# Patient Record
Sex: MALE | Race: BLACK OR AFRICAN AMERICAN | NOT HISPANIC OR LATINO
[De-identification: names, ages, dates, MRNs, and addresses within clinical notes are randomized per-mention and may not be internally consistent; named-entity substitution may affect disease eponyms.]

---

## 2023-02-01 ENCOUNTER — P2P PATIENT RECORD (OUTPATIENT)
Age: 28
End: 2023-02-01

## 2023-02-02 ENCOUNTER — TELEPHONE ENCOUNTER (OUTPATIENT)
Dept: URBAN - METROPOLITAN AREA CLINIC 63 | Facility: CLINIC | Age: 28
End: 2023-02-02

## 2023-02-10 ENCOUNTER — WEB ENCOUNTER (OUTPATIENT)
Dept: URBAN - METROPOLITAN AREA CLINIC 63 | Facility: CLINIC | Age: 28
End: 2023-02-10

## 2023-02-10 ENCOUNTER — DASHBOARD ENCOUNTERS (OUTPATIENT)
Age: 28
End: 2023-02-10

## 2023-02-10 ENCOUNTER — OFFICE VISIT (OUTPATIENT)
Dept: URBAN - METROPOLITAN AREA CLINIC 63 | Facility: CLINIC | Age: 28
End: 2023-02-10
Payer: COMMERCIAL

## 2023-02-10 ENCOUNTER — LAB OUTSIDE AN ENCOUNTER (OUTPATIENT)
Dept: URBAN - METROPOLITAN AREA CLINIC 63 | Facility: CLINIC | Age: 28
End: 2023-02-10

## 2023-02-10 VITALS
SYSTOLIC BLOOD PRESSURE: 140 MMHG | OXYGEN SATURATION: 98 % | TEMPERATURE: 98.2 F | DIASTOLIC BLOOD PRESSURE: 90 MMHG | WEIGHT: 165 LBS | HEART RATE: 69 BPM | BODY MASS INDEX: 27.49 KG/M2 | HEIGHT: 65 IN

## 2023-02-10 DIAGNOSIS — K59.04 CHRONIC IDIOPATHIC CONSTIPATION: ICD-10-CM

## 2023-02-10 DIAGNOSIS — R10.9 ABDOMINAL DISCOMFORT: ICD-10-CM

## 2023-02-10 DIAGNOSIS — R19.4 CHANGE IN BOWEL HABIT: ICD-10-CM

## 2023-02-10 PROBLEM — 82934008: Status: ACTIVE | Noted: 2023-02-10

## 2023-02-10 PROCEDURE — 99203 OFFICE O/P NEW LOW 30 MIN: CPT | Performed by: NURSE PRACTITIONER

## 2023-02-10 RX ORDER — IBUPROFEN 800 MG/1
TAKE 1 TABLET BY MOUTH THREE TIMES A DAY TABLET, FILM COATED ORAL
Qty: 90 EACH | Refills: 0 | Status: DISCONTINUED | COMMUNITY

## 2023-02-10 RX ORDER — DICLOFENAC SODIUM 10 MG/G
APPLY 4 G TOPICALLY 4 TIMES A DAY GEL TOPICAL
Qty: 100 GRAM | Refills: 0 | Status: DISCONTINUED | COMMUNITY

## 2023-02-10 RX ORDER — ALBUTEROL SULFATE 108 UG/1
INHALE 1-2 PUFFS EVERY 4-6 HOURS AS NEEDED FOR WHEEZING FOR UP TO 30 DAYS INHALANT RESPIRATORY (INHALATION)
Qty: 6.7 GRAM | Refills: 0 | Status: DISCONTINUED | COMMUNITY

## 2023-02-10 NOTE — HPI-TODAY'S VISIT:
Mr. Jose is a pleasant 27-year-old male evaluated as a new patient with complaint of constipation. Today, he complains of trouble emptying bowels and bladder x2 years.  Having to strain excessively to have BM. Having one BM per day, of soft to hard pellet consistency.  Also complains of incomplete evacuation, with sensation of muscle cramps in his pelvis.   Complains of bloating.  Denies diarrhea, melena, hematochezia, mucus.  Drinking a gallon of water daily, has increased fiber intake with improvement in stool consistency.   Was evaluated by urology and advised urinary symptoms may be related to his bowels.  Advised urinary tract is normal.  Admits lower back pain, seeing chiropractor.    EGD and colonoscopy naive.

## 2023-03-15 ENCOUNTER — TELEPHONE ENCOUNTER (OUTPATIENT)
Dept: URBAN - METROPOLITAN AREA CLINIC 63 | Facility: CLINIC | Age: 28
End: 2023-03-15

## 2023-03-21 ENCOUNTER — OFFICE VISIT (OUTPATIENT)
Dept: URBAN - METROPOLITAN AREA CLINIC 63 | Facility: CLINIC | Age: 28
End: 2023-03-21